# Patient Record
Sex: FEMALE | Race: WHITE | NOT HISPANIC OR LATINO | URBAN - NONMETROPOLITAN AREA
[De-identification: names, ages, dates, MRNs, and addresses within clinical notes are randomized per-mention and may not be internally consistent; named-entity substitution may affect disease eponyms.]

---

## 2019-02-21 ENCOUNTER — IMPORTED ENCOUNTER (OUTPATIENT)
Dept: URBAN - NONMETROPOLITAN AREA CLINIC 1 | Facility: CLINIC | Age: 64
End: 2019-02-21

## 2019-02-21 PROBLEM — H52.4: Noted: 2019-02-21

## 2019-02-21 PROBLEM — H16.223: Noted: 2019-02-21

## 2019-02-21 PROCEDURE — 92015 DETERMINE REFRACTIVE STATE: CPT

## 2019-02-21 PROCEDURE — 92004 COMPRE OPH EXAM NEW PT 1/>: CPT

## 2019-02-21 NOTE — PATIENT DISCUSSION
Presbyopia OU - New glasses Rx given - Continue to monitor yearly Dry Eyes OU- Discussed diagnosis in detail with patient - Discussed signs and symptoms associated including redness- Recommend using AT's such as Systane or Refresh BID-QID OU - Continue to monitor; Tatiana Notes: Father has glaucoma

## 2019-07-12 NOTE — PATIENT DISCUSSION
CORNEAL ULCER, OD  [facility] [employer]. PATIENT HAS BEEN USING OCUFLOX SINCE YESTERDAY AND PAIN IS NOW BETTER. D/C OCUFLOX. PRESCRIBE BESIVANCE Q 1 HOUR. STAY OUT OF CONTACTS. RETURN FOR FOLLOW-UP AS SCHEDULED.

## 2019-07-15 NOTE — PATIENT DISCUSSION
New Prescription: Pred Forte (prednisolone acetate): drops,suspension: 1% 1 drop three times a day into right eye 07-

## 2019-07-15 NOTE — PATIENT DISCUSSION
CORNEAL ULCER, OD [employer]. PATIENT DID NOT  DROPS FROM PHARMACY. RECOMMENDS STARTING BESIVANCE Q 2 HOURS TODAY AND WILL PRESCRIBE PRED FORTE OD TID. STAY OUT OF CONTACTS.  RETURN FOR FOLLOW-UP AS SCHEDULED

## 2019-07-15 NOTE — PATIENT DISCUSSION
Continue: Besivance (besifloxacin): drops,suspension: 0.6% 1 drop every two hours while awake into right eye 07-

## 2019-07-18 NOTE — PATIENT DISCUSSION
Continue: Pred Forte (prednisolone acetate): drops,suspension: 1% 1 drop three times a day into right eye 07-

## 2019-07-18 NOTE — PATIENT DISCUSSION
CORNEAL ULCER, OD [employer]. CONTINUE BESIVANCE QID  AND CONTINUE PRED FORTE OD TID FOR 3 MORE DAYS AND DISCONTINUE THEN CAN GO BACK INTO CONTACTS.  RETURN FOR FOLLOW-UP AS SCHEDULED

## 2022-04-10 ASSESSMENT — TONOMETRY
OD_IOP_MMHG: 14
OS_IOP_MMHG: 14

## 2022-04-10 ASSESSMENT — VISUAL ACUITY
OD_SC: 20/25-1
OS_SC: 20/30

## 2025-02-05 ENCOUNTER — NEW PATIENT (OUTPATIENT)
Age: 70
End: 2025-02-05

## 2025-02-05 DIAGNOSIS — H52.4: ICD-10-CM

## 2025-02-05 PROCEDURE — 92015 DETERMINE REFRACTIVE STATE: CPT

## 2025-02-05 PROCEDURE — 92004 COMPRE OPH EXAM NEW PT 1/>: CPT
